# Patient Record
Sex: MALE | Race: WHITE
[De-identification: names, ages, dates, MRNs, and addresses within clinical notes are randomized per-mention and may not be internally consistent; named-entity substitution may affect disease eponyms.]

---

## 2019-07-15 ENCOUNTER — HOSPITAL ENCOUNTER (OUTPATIENT)
Dept: HOSPITAL 74 - JASU-SURG | Age: 49
Discharge: HOME | End: 2019-07-15
Attending: UROLOGY
Payer: COMMERCIAL

## 2019-07-15 VITALS — BODY MASS INDEX: 27.3 KG/M2

## 2019-07-15 VITALS — TEMPERATURE: 98 F

## 2019-07-15 VITALS — DIASTOLIC BLOOD PRESSURE: 78 MMHG | SYSTOLIC BLOOD PRESSURE: 131 MMHG | HEART RATE: 50 BPM

## 2019-07-15 DIAGNOSIS — N20.0: Primary | ICD-10-CM

## 2019-07-15 PROCEDURE — 0TF4XZZ FRAGMENTATION IN LEFT KIDNEY PELVIS, EXTERNAL APPROACH: ICD-10-PCS | Performed by: UROLOGY

## 2019-07-15 NOTE — OP
Operative Note





- Note:


Operative Date: 07/15/19


Pre-Operative Diagnosis: Left renal stone


Operation: Left ESWL


Findings: 





5 mm mid pole renal stone


Post-Operative Diagnosis: Same as Pre-op


Surgeon: Derek Alanis


Anesthesia: Fractional


Estimated Blood Loss (mls): 0


Operative Report Dictated: Yes

## 2019-07-16 NOTE — OP
DATE OF OPERATION:  07/15/2019

 

PREOPERATIVE DIAGNOSIS:  Left renal stone. 

 

POSTOPERATIVE DIAGNOSIS:  Left renal stone.

 

PROCEDURE:  Left extracorporeal shock wave lithotripsy. 

 

ATTENDING:  Caryl Davies MD

 

ANESTHESIA:  Fractional.

 

OPERATION WENT AS FOLLOWS:  The patient was brought in the operating room, placed in

supine position on the operating room table.  Ultrasonography and fluoroscopy were

then performed.  A 5-mm left mid pole stone was identified.  Anesthesia and

preoperative antibiotics were then administered.  Shock wave lithotripsy was then

performed.  There were no complications noted.  The stone fragmented well under real

time ultrasonography and fluoroscopy.  The disposition of the patient was to the

recovery room.  

 

 

CARYL DAVIES M.D.

 

/[id number]

DD: 07/15/2019 18:14

DT: 07/16/2019 07:11

Job #:  [id number]

## 2020-08-24 ENCOUNTER — HOSPITAL ENCOUNTER (OUTPATIENT)
Dept: HOSPITAL 74 - JASU-SURG | Age: 50
Discharge: HOME | End: 2020-08-24
Attending: UROLOGY
Payer: COMMERCIAL

## 2020-08-24 VITALS — SYSTOLIC BLOOD PRESSURE: 131 MMHG | DIASTOLIC BLOOD PRESSURE: 74 MMHG | TEMPERATURE: 96.8 F | HEART RATE: 55 BPM

## 2020-08-24 VITALS — BODY MASS INDEX: 26.2 KG/M2

## 2020-08-24 DIAGNOSIS — N20.0: Primary | ICD-10-CM

## 2020-08-24 PROCEDURE — 0TF4XZZ FRAGMENTATION IN LEFT KIDNEY PELVIS, EXTERNAL APPROACH: ICD-10-PCS | Performed by: UROLOGY

## 2020-08-24 NOTE — OP
Operative Note





- Note:


Operative Date: 08/24/20


Pre-Operative Diagnosis: Left renal stone


Operation: Left ESWL


Findings: 





7 mm mid pole renal stone


Post-Operative Diagnosis: Same as Pre-op


Surgeon: Derek Alanis


Anesthesia: Regional


Estimated Blood Loss (mls): 0


Operative Report Dictated: Yes

## 2020-08-24 NOTE — OP
DATE OF OPERATION:  08/24/2020

 

PREOPERATIVE DIAGNOSIS:  Left renal stone.  

 

POSTOPERATIVE DIAGNOSIS:  Left renal stone.  

 

PROCEDURE:  Left extracorporeal shockwave lithotripsy.  

 

ATTENDING:  Caryl Alanis M.D. 

 

ANESTHESIA:  Fractional.  

 

DESCRIPTION OF PROCEDURE:  Patient was brought in the operating room, placed in a

supine position on the operating room table.  Ultrasonography and fluoroscopy were

performed.  A left 7-mm mid pole stone was identified.  At this point, anesthesia and

preoperative antibiotics were then administered.  Shockwave lithotripsy was then

performed.  2500 impulses at 17 joules of power were administered to the stone with

excellent fragmentation noted under realtime ultrasonography and fluoroscopy.  The

patient tolerated the procedure very well.   

 

 

CARYL DAVIES M.D. SE/[id number]

DD: 08/24/2020 15:09

DT: 08/24/2020 16:29

Job #:  [id number]

## 2023-02-08 ENCOUNTER — HOSPITAL ENCOUNTER (EMERGENCY)
Dept: HOSPITAL 74 - JERFT | Age: 53
Discharge: HOME | End: 2023-02-08
Payer: COMMERCIAL

## 2023-02-08 VITALS
TEMPERATURE: 98 F | HEART RATE: 71 BPM | DIASTOLIC BLOOD PRESSURE: 71 MMHG | RESPIRATION RATE: 18 BRPM | SYSTOLIC BLOOD PRESSURE: 123 MMHG

## 2023-02-08 VITALS — BODY MASS INDEX: 25.7 KG/M2
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
## 2023-02-08 DIAGNOSIS — S61.102A: Primary | ICD-10-CM

## 2023-02-08 PROCEDURE — 0HQGXZZ REPAIR LEFT HAND SKIN, EXTERNAL APPROACH: ICD-10-PCS

## 2023-02-28 ENCOUNTER — HOSPITAL ENCOUNTER (EMERGENCY)
Dept: HOSPITAL 74 - JERFT | Age: 53
Discharge: HOME | End: 2023-02-28
Payer: COMMERCIAL

## 2023-02-28 VITALS
HEART RATE: 50 BPM | DIASTOLIC BLOOD PRESSURE: 44 MMHG | SYSTOLIC BLOOD PRESSURE: 140 MMHG | TEMPERATURE: 97.2 F | RESPIRATION RATE: 16 BRPM

## 2023-02-28 VITALS — BODY MASS INDEX: 26.4 KG/M2

## 2023-02-28 DIAGNOSIS — S61.012A: Primary | ICD-10-CM

## 2023-02-28 DIAGNOSIS — Z48.02: ICD-10-CM
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
